# Patient Record
(demographics unavailable — no encounter records)

---

## 2017-04-12 RX ORDER — LANCETS
EACH MISCELLANEOUS
Qty: 102 STRIP | Refills: 1 | Status: SHIPPED | OUTPATIENT
Start: 2017-04-12

## 2017-04-12 NOTE — TELEPHONE ENCOUNTER
From: Jai Reddy  To: Jolanta Greenwood MD  Sent: 4/12/2017 9:31 AM EDT  Subject: Medication Renewal Request    Original authorizing provider: MD Jai Elliott would like a refill of the following medications:  ACCU-CHEK COMPACT PLUS TEST strp Jolanta Greenwood MD]    [phone]    Comment:  Dr. MORALES--My \"new\" endocrinologist cannot see me until late July. Meanwhile I am out of test strips. I hope you can support a new prescription faxed to [facility] in [facility] at [phone]. Please let me know if that imposition will work for you. Thanks.  Dayne Colin